# Patient Record
Sex: MALE | Race: OTHER | HISPANIC OR LATINO | URBAN - METROPOLITAN AREA
[De-identification: names, ages, dates, MRNs, and addresses within clinical notes are randomized per-mention and may not be internally consistent; named-entity substitution may affect disease eponyms.]

---

## 2019-06-04 ENCOUNTER — EMERGENCY (EMERGENCY)
Facility: HOSPITAL | Age: 49
LOS: 1 days | Discharge: HOME | End: 2019-06-04
Attending: EMERGENCY MEDICINE | Admitting: EMERGENCY MEDICINE
Payer: SELF-PAY

## 2019-06-04 VITALS
TEMPERATURE: 97 F | OXYGEN SATURATION: 98 % | RESPIRATION RATE: 16 BRPM | DIASTOLIC BLOOD PRESSURE: 68 MMHG | HEART RATE: 66 BPM | SYSTOLIC BLOOD PRESSURE: 124 MMHG

## 2019-06-04 VITALS
DIASTOLIC BLOOD PRESSURE: 73 MMHG | RESPIRATION RATE: 16 BRPM | HEIGHT: 65 IN | SYSTOLIC BLOOD PRESSURE: 120 MMHG | OXYGEN SATURATION: 98 % | WEIGHT: 210.1 LBS | TEMPERATURE: 98 F | HEART RATE: 72 BPM

## 2019-06-04 PROCEDURE — 90471 IMMUNIZATION ADMIN: CPT

## 2019-06-04 PROCEDURE — 99283 EMERGENCY DEPT VISIT LOW MDM: CPT

## 2019-06-04 PROCEDURE — 99283 EMERGENCY DEPT VISIT LOW MDM: CPT | Mod: 25

## 2019-06-04 PROCEDURE — 73630 X-RAY EXAM OF FOOT: CPT

## 2019-06-04 PROCEDURE — 90715 TDAP VACCINE 7 YRS/> IM: CPT

## 2019-06-04 PROCEDURE — 73630 X-RAY EXAM OF FOOT: CPT | Mod: 26,RT

## 2019-06-04 RX ORDER — TETANUS TOXOID, REDUCED DIPHTHERIA TOXOID AND ACELLULAR PERTUSSIS VACCINE, ADSORBED 5; 2.5; 8; 8; 2.5 [IU]/.5ML; [IU]/.5ML; UG/.5ML; UG/.5ML; UG/.5ML
0.5 SUSPENSION INTRAMUSCULAR ONCE
Refills: 0 | Status: COMPLETED | OUTPATIENT
Start: 2019-06-04 | End: 2019-06-04

## 2019-06-04 RX ORDER — IBUPROFEN 200 MG
600 TABLET ORAL ONCE
Refills: 0 | Status: COMPLETED | OUTPATIENT
Start: 2019-06-04 | End: 2019-06-04

## 2019-06-04 RX ORDER — MOXIFLOXACIN HYDROCHLORIDE TABLETS, 400 MG 400 MG/1
1 TABLET, FILM COATED ORAL
Qty: 20 | Refills: 0
Start: 2019-06-04 | End: 2019-06-13

## 2019-06-04 RX ADMIN — Medication 600 MILLIGRAM(S): at 15:45

## 2019-06-04 RX ADMIN — TETANUS TOXOID, REDUCED DIPHTHERIA TOXOID AND ACELLULAR PERTUSSIS VACCINE, ADSORBED 0.5 MILLILITER(S): 5; 2.5; 8; 8; 2.5 SUSPENSION INTRAMUSCULAR at 15:26

## 2019-06-04 RX ADMIN — Medication 600 MILLIGRAM(S): at 15:27

## 2019-06-04 NOTE — ED ADULT NURSE NOTE - OBJECTIVE STATEMENT
Pt came in for puncture wound over the right foot. Pt stated that he accidentally stepped on a nail while working on a shed today. Pt took Tylenol at home and his family cleaned his wound. Pt speak Romansh and requested his family to translate. ( Niece Annie Gregorya)

## 2019-06-04 NOTE — ED PROVIDER NOTE - PROGRESS NOTE DETAILS
Pt examined by ED attending, Dr. Zavala who agreed with disposition and plan. L foot copiously irrigated with normal saline and betadine, bacitracin applied and covered with sterile dressing. Tdap updated. Will dc home on cipro, f/u podiatry. Reevaluated patient at bedside.  Patient feeling much improved.  Discussed the results of all diagnostic testing in ED and copies of all reports given.   An opportunity to ask questions was given.  Discussed the importance of prompt, close medical follow-up.  Patient will return with any changes, concerns or persistent / worsening symptoms.  Understanding of all instructions verbalized.

## 2019-06-04 NOTE — ED PROVIDER NOTE - CHPI ED SYMPTOMS NEG
no drainage/no purulent drainage/no redness/no red streaks/no vomiting/no chills/no bleeding at site/no fever

## 2019-06-04 NOTE — ED PROVIDER NOTE - OBJECTIVE STATEMENT
50 y/o M presents with c/o puncture wound to R foot x today. Pt states that he was working in the yard outside the house, was wearing boots and stepped on a nail sticking out of a piece of wood around 1pm today. States that the nail punctured his R foot through his shoe and sustained a puncture wound. Pt does not recall his last tetanus. Denies active bleeding, numbness, tingling, FB sensation, redness/drainage, fever, n/v or other symptoms/injuries.

## 2019-06-04 NOTE — ED PROVIDER NOTE - SKIN, MLM
+puncture wound noted to plantar aspect of distal 1st metatarsal R foot, no active bleeding/discharge/streaking/erythema noted, +ttp with mild surrounding inflammation noted, toes warm & mobile, cap refill<2sec, pulses and sensation intact, NVI

## 2019-06-04 NOTE — ED PROVIDER NOTE - CLINICAL SUMMARY MEDICAL DECISION MAKING FREE TEXT BOX
50 yo M with puncture wound to R plantar foot by a nail on a piece of wood which stuck his R foot through his boots while walking outside today, +puncture wound noted to plantar aspect of distal 1st metatarsal, will update tdap, xray to r/o FB, motrin, abx, f/u podiatry

## 2019-06-04 NOTE — ED PROVIDER NOTE - ATTENDING CONTRIBUTION TO CARE
pt with c/o puncture wound through the bottom of the shoe.  xray neg.  abx to cover pseudomonas.  tetanus provided.  strict f/u for worsening infection.

## 2022-12-08 NOTE — ED ADULT NURSE NOTE - BREATHING, MLM
Return Appointment:   1 week with Rory Mcgrath MD        Instructions: Left Lateral Heel:  Cleanse with Normal Saline  Puraply applied to wound base. Lot #: [id number]. 1.1J; Exp date: 09/20/2024. Secure with Mepilex transfer and ABD. Wrap with kerlix and Coban to level of the ankle lightly to secure graft only. Dressing change in 1 week at wound center. Plan to apply Puraply (1.6 cm disc) at next visit     Float Heel When Patient in Recliner  When in bed, with wear Rooke Boot or float heel on pillow  Hold home health for 1 week due to placement of Puraply. Spontaneous, unlabored and symmetrical

## 2024-01-08 NOTE — ED ADULT TRIAGE NOTE - NS ED NURSE BANDS TYPE
Care Due:                  Date            Visit Type   Department     Provider  --------------------------------------------------------------------------------                                EP -                              PRIMARY      [facility] INTERNAL  Keith Groves  Last Visit: 10-      CARE ([facility])   [facility]  Next Visit: None Scheduled  None         None Found                                                            Last  Test          Frequency    Reason                     Performed    Due Date  --------------------------------------------------------------------------------    HBA1C.......  6 months...  metFORMIN................  10-   03-    [facility] [employer] Embedded Care Due Messages. Reference number: [id number].   1/08/2024 1:13:54 PM CST  
Name band;